# Patient Record
Sex: MALE | Race: WHITE | Employment: FULL TIME | URBAN - METROPOLITAN AREA
[De-identification: names, ages, dates, MRNs, and addresses within clinical notes are randomized per-mention and may not be internally consistent; named-entity substitution may affect disease eponyms.]

---

## 2019-07-17 ENCOUNTER — HOSPITAL ENCOUNTER (OUTPATIENT)
Dept: DIABETES SERVICES | Age: 44
Discharge: HOME | End: 2019-07-17
Payer: COMMERCIAL

## 2019-07-17 DIAGNOSIS — E11.65 UNCONTROLLED TYPE 2 DIABETES MELLITUS WITH HYPERGLYCEMIA (HCC): ICD-10-CM

## 2019-07-17 PROCEDURE — G0108 DIAB MANAGE TRN  PER INDIV: HCPCS

## 2019-07-17 NOTE — DIABETES MGMT
[facility]    7/17/2019      Dear Santos Smith MD,    Thank you for your kind referral. Your patient, Ting Hunter, attended a one to one diabetes education session at [place] [address] where the following topics were covered today:    *Describing diabetes disease process and treatment options   *Incorporating nutrition management into lifestyle   *Monitoring blood glucose and other parameters and interpreting and using the results for self management decision making   *Preventing, detecting and treating acute complications  *Incorporating physical activity into lifestyle   *Using medication(s) safely and for maximum therapeutic benefit   *Develop personal strategies to promote health and behavior change  *Developing personal strategies to address psychosocial issues and concerns    Data from this visit:  Weight:7/17/2019 186.4 #   HgbA1c:7/9/2019 9.6 %    Increased risk for diabetes: 5.7-6.4% Diabetes: >6.4%  Glycemic control for adults with diabetes: <7%   Elderly or multiple medical conditions: < 8%    Blood Glucose:7/17/2019 2 Hrs Post-Breakfast 123 mg/dl  Meter given: Accu-Check Guide  Goal(s) set :   Goal 1: Make better food choices - Change meals to include a carb, protein and vegetable source at every meal, 7 days a week    Notes/Comments:  Ting Roth came to visit today with his wife, Travis Franz. This is brand new diagnosis for him (as of July 9th) and he is very skeptical and has many questions. He is seen individually today due to difficult work schedule. Ting Roth was not sure what diabetes was, so we spent time reviewing the diabetes disease process. He had many questions about the risk factors associated with type 2 diabetes therefore that was discussed in depth. He understands weight is not the only factor affecting his diagnosis, and his family history, ethnicity, age, sedentary lifestyle are all contributing factors in his diagnosis.      He is not currently checking his BG as it is a new diagnosis. He was given an Accu-Check guide meter today in the office as he does not like needles and does not want to see them. We reviewed proper technique and he was able to return demonstration in the office where his BG post breakfast was 123 mg/dL. We reviewed target ranges for pre and post meals and the various factors that can affect his BG. He agreed to begin checking Bg once daily, rotating the time of day he checks. He reports having a new 11 month old child, and 2 other kids (age 3 and 1) that cause him to not sleep well, high stress levels, and 2 recent illnesses that he thinks impacted his A1C. We reviewed his A1C, the target value, the diagnostic criteria and what the lab represents. He verbalized understanding. Ting Roth was prescribed metformin however is not going to take it for at least 3 months, as he thinks he can manage this with diet and exercise. He was encouraged to discuss this with his PCP and the mechanism of action of metformin was explained to him. He understands it is to help lower his BG. If in 3 months his A1C is not lower he agrees to begin taking the medications. He is currently sedentary and we briefly discussed benefits of exercise on  BG control. He agreed to try and walk more. Basics of meal planning were reviewed using myplate method. He was given the goal of 3-4 servings of carbs per meal. Written material was provided. Your patient will have two additional appointments to complete the ordered education. Their next visit is scheduled for August 20, 2019. We look forward to assisting your patient in meeting their self-management goals. If you have any questions, please do not hesitate to call the [facility] at [phone].     Sincerely, Mariam Winston

## 2019-08-20 ENCOUNTER — HOSPITAL ENCOUNTER (OUTPATIENT)
Dept: DIABETES SERVICES | Age: 44
Discharge: HOME | End: 2019-08-20
Payer: COMMERCIAL

## 2019-08-20 DIAGNOSIS — E11.65 UNCONTROLLED TYPE 2 DIABETES MELLITUS WITH HYPERGLYCEMIA (HCC): ICD-10-CM

## 2019-08-20 PROCEDURE — G0108 DIAB MANAGE TRN  PER INDIV: HCPCS

## 2019-08-20 NOTE — DIABETES MGMT
[facility]    8/20/2019      Dear Tadeo Castillo MD,    Your patient, Aretha Hunter, attended one to one education at [place] [address] where the following topics were covered today:    *Incorporating nutritional management into lifestyle   *Monitoring blood glucose and other parameters and interpreting and using the results for self- management decision making   *Preventing, detecting and treating acute complications   *Preventing, detecting and treating chronic complications   *Incorporating physical activity into lifestyle   *Using medication(s) safely and for maximum therapeutic benefit    Notes/Comments:   Aretha Rodriguez returned today to continue his educationally individually due to work schedule. Today he came by himself. Since our last visit, he has made drastic changes to his lifestyle. He notes this is very common for him to do the extreme. He has lost 13.9# since our last visit on July 17. He stated he was not necessarily trying to lose weight, but he did change his eating drastically and start exercising. The majority of today's visit was spent discussing meal planning and carbohydrate counting. He is eating 3 meals and 2 snacks daily. His breakfast has been too good greek yogurt with 1 tbsp. Of cereal and 5-6 blueberries with 2 hard boiled eggs. His mid morning snacks is 1/4 cup peanuts of mixed nuts. Lunch is usually 2 cups of mixed greens with cheese, hard boiled egg, turkey or grilled chicken, cucumbers and bell peppers and 1 tbsp of ranch. He may have 3 strawberries with this meal. Afternoon snack is usually a beef stick. Dinner varies the most and is a protein (salmon/chicken) with a  Vegetable (broccoli/cabbage). Some nights (about once a week) he will fast for dinner. All of his beverages are water or unsweetened tea. We discussed the importance of carbohydrates and what their function is in the body.  He was very skeptical to add them back into his meal plan, so we discussed adding them back in slowly--starting with 30-45 grams of carbs per meal and working our way to 45-60 grams as he feels more comfortable. He agreed to eating more carbs as he understands he can check his BG before and after a meal and see if 30 grams is too much. We talked through his meals and how he can tweak them to increase his carb intake. He seemed receptive once further explanation was given on importance of consistent carb intake. He plans on trying to increase fiber intake. We reviewed how to read labels and he has been doing this in addition to tracking his meals. We briefly discussed exercise, but ran out of time. He has started walking one mile after eating lunch (Monday through Friday). He was encouraged to continue with this. The topic of exercise will need to be completed at his next visit, as well as chronic complications associated with high blood sugars. We will also cover dining out, stress, and healthy coping techniques. Your patient has one (1) more appointment to complete the ordered education. Their next visit is scheduled for October 9, 2019. We look forward to assisting your patient in meeting their self-management goals. If you have any questions, please do not hesitate to call the [facility] at [phone].     Sincerely, Cruz Mast, [address]  [facility]  ARISTE[address] [place], [address]  Phone: [phone] Fax: [phone]

## 2019-10-09 ENCOUNTER — HOSPITAL ENCOUNTER (OUTPATIENT)
Dept: DIABETES SERVICES | Age: 44
Discharge: HOME | End: 2019-10-09
Payer: COMMERCIAL

## 2019-10-09 DIAGNOSIS — E11.65 UNCONTROLLED TYPE 2 DIABETES MELLITUS WITH HYPERGLYCEMIA (HCC): ICD-10-CM

## 2019-10-09 PROCEDURE — G0108 DIAB MANAGE TRN  PER INDIV: HCPCS

## 2019-10-09 PROCEDURE — G0108 DIAB MANAGE TRN  PER INDIV: HCPCS | Performed by: DIETITIAN, REGISTERED

## 2019-10-09 NOTE — DIABETES MGMT
[facility]    10/9/2019      Dear Ricardo Dixon MD,    Thank you for your  kind referral.  Your patient, Abram Hunter, has completed his/her personal initial comprehensive education plan. The education plan included the following topics: Healthy Eating, Being Active, Taking Medicines, Monitoring, Reducing Risks, Healthy Coping and Problem Solving. Data from visit:  Weight: 7/17/2019 186.4 #;     10/9/2019 171.9 #  HgbA1c: 7/9/2019 9.6% , 10/9/2019 5.4 %    Increased risk for diabetes: 5.7-6.4 %,  Diabetes: >6.4%  Glycemic control for adults with diabetes: <7%     Elderly or multiple medical conditions: <8%    Your patient achieved & continued the following goal(s) from their first class:   Goal 1: Make better food choices - Change meals to include a carb, protein and vegetable source at every meal, 7 days a week    Education Learning Outcomes for All Education Topics(see above): Demonstrated Competency       If you have any questions, please do not hesitate to [facility] at [phone].     [address] [place] [address]  [address] [place], [address]  Phone: [phone]  Fax: [phone]